# Patient Record
Sex: MALE | Race: WHITE | NOT HISPANIC OR LATINO | URBAN - METROPOLITAN AREA
[De-identification: names, ages, dates, MRNs, and addresses within clinical notes are randomized per-mention and may not be internally consistent; named-entity substitution may affect disease eponyms.]

---

## 2020-06-04 ENCOUNTER — OFFICE VISIT (OUTPATIENT)
Dept: URBAN - METROPOLITAN AREA TELEHEALTH 2 | Facility: TELEHEALTH | Age: 85
End: 2020-06-04
Payer: MEDICARE

## 2020-06-04 DIAGNOSIS — D50.9 IDA (IRON DEFICIENCY ANEMIA): ICD-10-CM

## 2020-06-04 DIAGNOSIS — K57.90 DIVERTICULOSIS: ICD-10-CM

## 2020-06-04 DIAGNOSIS — Z86.010 PERSONAL HISTORY OF COLONIC POLYPS: ICD-10-CM

## 2020-06-04 DIAGNOSIS — K44.9 HIATAL HERNIA: ICD-10-CM

## 2020-06-04 PROCEDURE — G8427 DOCREV CUR MEDS BY ELIG CLIN: HCPCS | Performed by: NURSE PRACTITIONER

## 2020-06-04 PROCEDURE — 99443 PHONE E/M BY PHYS 21-30 MIN: CPT | Performed by: NURSE PRACTITIONER

## 2020-06-04 RX ORDER — RAMIPRIL 5 MG/1
TAKE 1 CAPSULE (5 MG) BY ORAL ROUTE 2 TIMES PER DAY CAPSULE ORAL 2
Qty: 0 | Refills: 0 | Status: ACTIVE | COMMUNITY
Start: 1900-01-01 | End: 1900-01-01

## 2020-06-04 RX ORDER — CLOPIDOGREL 75 MG/1
TAKE 1 TABLET (75 MG) BY ORAL ROUTE ONCE DAILY TABLET ORAL 1
Qty: 0 | Refills: 0 | Status: ACTIVE | COMMUNITY
Start: 1900-01-01 | End: 1900-01-01

## 2020-06-04 RX ORDER — FUROSEMIDE 40 MG/1
AS DIRECTED TABLET ORAL
Qty: 0 | Refills: 0 | Status: ACTIVE | COMMUNITY
Start: 1900-01-01 | End: 1900-01-01

## 2020-06-04 RX ORDER — ATORVASTATIN CALCIUM 40 MG/1
TAKE 1 TABLET (40 MG) BY ORAL ROUTE ONCE DAILY TABLET, FILM COATED ORAL 1
Qty: 0 | Refills: 0 | Status: ACTIVE | COMMUNITY
Start: 1900-01-01 | End: 1900-01-01

## 2020-06-04 RX ORDER — NITROGLYCERIN 0.4 MG/1
PLACE 1 TABLET (0.4 MG) BY BUCCAL ROUTE AT THE FIRST SIGN OF AN ATTACK; NO MORE THAN 3 TABS ARE RECOMMENDED WITHIN A 15 MINUTE PERIOD TABLET SUBLINGUAL
Qty: 1 | Refills: 0 | Status: ACTIVE | COMMUNITY
Start: 1900-01-01 | End: 1900-01-01

## 2020-06-04 RX ORDER — POTASSIUM CHLORIDE 750 MG/1
TAKE 1 TABLET (10 MEQ) BY ORAL ROUTE 2 TIMES PER DAY WITH FOOD TABLET, EXTENDED RELEASE ORAL 2
Qty: 0 | Refills: 0 | Status: ACTIVE | COMMUNITY
Start: 1900-01-01 | End: 1900-01-01

## 2020-06-04 RX ORDER — CARVEDILOL 3.12 MG/1
TAKE 1 TABLET (3.125 MG) BY ORAL ROUTE 2 TIMES PER DAY WITH FOOD TABLET, FILM COATED ORAL 2
Qty: 0 | Refills: 0 | Status: ACTIVE | COMMUNITY
Start: 1900-01-01 | End: 1900-01-01

## 2020-06-04 RX ORDER — CHLORHEXIDINE GLUCONATE 4 %
TAKE 1 TABLET (325 MG) BY ORAL ROUTE 2 TIMES PER DAY LIQUID (ML) TOPICAL 2
Qty: 0 | Refills: 0 | Status: ACTIVE | COMMUNITY
Start: 1900-01-01 | End: 1900-01-01

## 2020-06-04 RX ORDER — AMIODARONE HYDROCHLORIDE 200 MG/1
TAKE 1 TABLET (200 MG) BY ORAL ROUTE ONCE DAILY TABLET ORAL 1
Qty: 0 | Refills: 0 | Status: ACTIVE | COMMUNITY
Start: 1900-01-01 | End: 1900-01-01

## 2020-06-04 RX ORDER — PANTOPRAZOLE SODIUM 40 MG/1
TAKE 1 TABLET BY ORAL ROUTE 2 TIMES A DAY TABLET, DELAYED RELEASE ORAL 2
Qty: 180 | Refills: 3 | Status: ACTIVE | COMMUNITY
Start: 2019-09-04 | End: 2020-08-29

## 2020-06-04 RX ORDER — ISOSORBIDE MONONITRATE 30 MG/1
TAKE 1 TABLET (30 MG) BY ORAL ROUTE ONCE DAILY IN THE MORNING TABLET, EXTENDED RELEASE ORAL 1
Qty: 0 | Refills: 0 | Status: ACTIVE | COMMUNITY
Start: 1900-01-01 | End: 1900-01-01

## 2020-06-04 NOTE — PHYSICAL EXAM HENT:
Head,  normocephalic,  atraumatic,  Face,  Face within normal limits,  Ears,  External ears within normal limits,  Nose/Nasopharynx,  External nose  normal appearance,  nares patent,  no nasal discharge,  Mouth and Throat,  Oral cavity appearance normal,  Lips,  Appearance normal

## 2020-06-04 NOTE — HPI-TODAY'S VISIT:
2020 Mr. Earl presents for follow up of BRADFORD and history of GI bleeding. He states he saw Dr. Chaudhry and was told his hemoglobin was too high. He has been off his iron supplement for over a month. He denies any further GI complaints. He states his bowels are normal with no bleeding or melena. He denies any nausea or vomiging. He is on protonix 40mg BID, he is not sure he is taking the carafate. He is not seeing hematology. Today he is diong well with no new GI complaints. MB   2019 Mr. Earl presents for follow up of BRADFORD and GI bleeding. Since his last visit he has been doing well on protonix 40mg BID and carafate BID with no further episodes of GI bleeding. his repeat labs show normal H/H at his September appointment. He denies any further melena, his bowels are dark on the oral iron. He is feeling well today with no further indigestion on the carafate. Today he has no further GI complaints. MB  19 Mr Earl is here for hospital f/u of melena. He had been having dark looser stool for about a week. He started to feel very weak bringing him to the ER. His Hbg was 9. It had been 15 a month prior. 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 He had an inpatinet enteroscopy with DR hendrix. 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 He denies any further dark stool. He is on protonix 40mg daily. He has been having more indigestion lately. He was on protonix 40mg BID and carafate earlier this year and this seemed to help. He remains of Xarelto. He was on plavix. He is not sure if he is still taking this.   2019 Mr. Earl presents for [facility] hospital follow up. He was admitted in early March with an NSTEMI. He was started on heparin and noted to have dark stools. His hemoccult was positive. He has a history of occult GI bleeding. He is s/p EGD and Colonoscopy in 2018 with hiatal hernia and left sided diverticulosis by Dr. Goyal with no bleeding. In  he had an EGD with rashard's erosions and diverticular disease by Dr. Sunshine. We did not pursue CTE/VCE given normal fit and normalization of H/H on iron and PPI. Durin his admission his H/H stayed above 13. His stools are always dark on oral iron BID. He is on xeralto for a-fib and now plavix with NSTEMI. His bowels are still dark but no mention of melena or BRBPR. He is on carafate TID and Protonix 40mg BID. MB      Patient seen today via telehealth by agreement and consent of patient in light of current COVID-19 pandemic. I used video conferencing during the visit. The patient encounter is appropriate and reasonable under the circumstances given the patient's particular presentation at this time. The patient has been advised of the followin) the potential risks and limitations of this mode of treatment (including but not limited to the absence of in-person examination); 2) the right to refuse telehealth services at any point without affecting the right to future care; 3) the right to receive in-person services, included immediately after this consultation if an urgent need arises; 4) information, including identifiable images or information from this telehealth consult, will only be shared in accordance with HIPPA regulations. Any and all of the patient's and/or patient's family member's questions on this issue have been answered. The patient has verbally consented to be treated via telehealth services. The patient has also been advised to contact this office for worsening conditions or problems, and seek emergency medical treatment and/or call 911 if the patient deems either necessary.

## 2020-07-16 ENCOUNTER — DASHBOARD ENCOUNTERS (OUTPATIENT)
Age: 85
End: 2020-07-16

## 2020-07-16 ENCOUNTER — OFFICE VISIT (OUTPATIENT)
Dept: URBAN - METROPOLITAN AREA TELEHEALTH 2 | Facility: TELEHEALTH | Age: 85
End: 2020-07-16
Payer: MEDICARE

## 2020-07-16 DIAGNOSIS — K44.9 HIATAL HERNIA: ICD-10-CM

## 2020-07-16 DIAGNOSIS — K57.90 DIVERTICULOSIS: ICD-10-CM

## 2020-07-16 DIAGNOSIS — D50.9 IDA (IRON DEFICIENCY ANEMIA): ICD-10-CM

## 2020-07-16 DIAGNOSIS — K57.30 ACQUIRED DIVERTICULOSIS OF COLON: ICD-10-CM

## 2020-07-16 DIAGNOSIS — D50.8 IRON DEFICIENCY ANEMIA DUE TO DIETARY CAUSES: ICD-10-CM

## 2020-07-16 DIAGNOSIS — Z86.010 PERSONAL HISTORY OF COLONIC POLYPS: ICD-10-CM

## 2020-07-16 PROBLEM — 428283002: Status: ACTIVE | Noted: 2020-06-04

## 2020-07-16 PROCEDURE — 99441 PHONE E/M BY PHYS 5-10 MIN: CPT | Performed by: NURSE PRACTITIONER

## 2020-07-16 RX ORDER — PANTOPRAZOLE SODIUM 40 MG/1
TAKE 1 TABLET BY ORAL ROUTE 2 TIMES A DAY TABLET, DELAYED RELEASE ORAL 2
Qty: 180 | Refills: 3 | Status: ACTIVE | COMMUNITY
Start: 2019-09-04 | End: 2020-08-29

## 2020-07-16 RX ORDER — NITROGLYCERIN 0.4 MG/1
PLACE 1 TABLET (0.4 MG) BY BUCCAL ROUTE AT THE FIRST SIGN OF AN ATTACK; NO MORE THAN 3 TABS ARE RECOMMENDED WITHIN A 15 MINUTE PERIOD TABLET SUBLINGUAL
Qty: 1 | Refills: 0 | Status: ACTIVE | COMMUNITY
Start: 1900-01-01

## 2020-07-16 RX ORDER — FUROSEMIDE 40 MG/1
AS DIRECTED TABLET ORAL
Qty: 0 | Refills: 0 | Status: ACTIVE | COMMUNITY
Start: 1900-01-01

## 2020-07-16 RX ORDER — ISOSORBIDE MONONITRATE 30 MG/1
TAKE 1 TABLET (30 MG) BY ORAL ROUTE ONCE DAILY IN THE MORNING TABLET, EXTENDED RELEASE ORAL 1
Qty: 0 | Refills: 0 | Status: ACTIVE | COMMUNITY
Start: 1900-01-01

## 2020-07-16 RX ORDER — RAMIPRIL 5 MG/1
TAKE 1 CAPSULE (5 MG) BY ORAL ROUTE 2 TIMES PER DAY CAPSULE ORAL 2
Qty: 0 | Refills: 0 | Status: ACTIVE | COMMUNITY
Start: 1900-01-01

## 2020-07-16 RX ORDER — POTASSIUM CHLORIDE 750 MG/1
TAKE 1 TABLET (10 MEQ) BY ORAL ROUTE 2 TIMES PER DAY WITH FOOD TABLET, EXTENDED RELEASE ORAL 2
Qty: 0 | Refills: 0 | Status: ACTIVE | COMMUNITY
Start: 1900-01-01

## 2020-07-16 RX ORDER — ATORVASTATIN CALCIUM 40 MG/1
TAKE 1 TABLET (40 MG) BY ORAL ROUTE ONCE DAILY TABLET, FILM COATED ORAL 1
Qty: 0 | Refills: 0 | Status: ACTIVE | COMMUNITY
Start: 1900-01-01

## 2020-07-16 RX ORDER — AMIODARONE HYDROCHLORIDE 200 MG/1
TAKE 1 TABLET (200 MG) BY ORAL ROUTE ONCE DAILY TABLET ORAL 1
Qty: 0 | Refills: 0 | Status: ACTIVE | COMMUNITY
Start: 1900-01-01

## 2020-07-16 RX ORDER — CARVEDILOL 3.12 MG/1
TAKE 1 TABLET (3.125 MG) BY ORAL ROUTE 2 TIMES PER DAY WITH FOOD TABLET, FILM COATED ORAL 2
Qty: 0 | Refills: 0 | Status: ACTIVE | COMMUNITY
Start: 1900-01-01

## 2020-07-16 RX ORDER — CLOPIDOGREL 75 MG/1
TAKE 1 TABLET (75 MG) BY ORAL ROUTE ONCE DAILY TABLET ORAL 1
Qty: 0 | Refills: 0 | Status: ACTIVE | COMMUNITY
Start: 1900-01-01

## 2020-07-16 RX ORDER — CHLORHEXIDINE GLUCONATE 4 %
TAKE 1 TABLET (325 MG) BY ORAL ROUTE 2 TIMES PER DAY LIQUID (ML) TOPICAL 2
Qty: 0 | Refills: 0 | Status: ACTIVE | COMMUNITY
Start: 1900-01-01

## 2020-07-16 NOTE — HPI-TODAY'S VISIT:
7/16/2020 12/4/2019 Mr. Earl presents for follow up of BRADFORD and GI bleeding. Since his last visit he has been doing well on protonix 40mg BID and carafate BID with no further episodes of GI bleeding. his repeat labs show normal H/H at his September appointment. He denies any further melena, his bowels are dark on the oral iron. He is feeling well today with no further indigestion on the carafate. Today he has no further GI complaints. MB  9/4/19 Mr Earl is here for hospital f/u of melena. He had been having dark looser stool for about a week. He started to feel very weak bringing him to the ER. His Hbg was 9. It had been 15 a month prior. 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 He had an inpatinet enteroscopy with DR hendrix. 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 He denies any further dark stool. He is on protonix 40mg daily. He has been having more indigestion lately. He was on protonix 40mg BID and carafate earlier this year and this seemed to help. He remains of Xarelto. He was on plavix. He is not sure if he is still taking this. CS 4/2019 Mr. Earl presents for [facility] hospital follow up. He was admitted in early March with an NSTEMI. He was started on heparin and noted to have dark stools. His hemoccult was positive. He has a history of occult GI bleeding. He is s/p EGD and Colonoscopy in January 2018 with hiatal hernia and left sided diverticulosis by Dr. Goyal with no bleeding. In 2017 he had an EGD with rashard's erosions and diverticular disease by Dr. Sunshine. We did not pursue CTE/VCE given normal fit and normalization of H/H on iron and PPI. Durin his admission his H/H stayed above 13. His stools are always dark on oral iron BID. He is on xeralto for a-fib and now plavix with NSTEMI. His bowels are still dark but no mention of melena or BRBPR. He is on carafate TID and Protonix 40mg BID. MB

## 2021-01-14 ENCOUNTER — OFFICE VISIT (OUTPATIENT)
Dept: URBAN - METROPOLITAN AREA TELEHEALTH 2 | Facility: TELEHEALTH | Age: 86
End: 2021-01-14

## 2021-01-14 RX ORDER — FUROSEMIDE 40 MG/1
AS DIRECTED TABLET ORAL
Qty: 0 | Refills: 0 | Status: ACTIVE | COMMUNITY
Start: 1900-01-01

## 2021-01-14 RX ORDER — ISOSORBIDE MONONITRATE 30 MG/1
TAKE 1 TABLET (30 MG) BY ORAL ROUTE ONCE DAILY IN THE MORNING TABLET, EXTENDED RELEASE ORAL 1
Qty: 0 | Refills: 0 | Status: ACTIVE | COMMUNITY
Start: 1900-01-01

## 2021-01-14 RX ORDER — CLOPIDOGREL 75 MG/1
TAKE 1 TABLET (75 MG) BY ORAL ROUTE ONCE DAILY TABLET ORAL 1
Qty: 0 | Refills: 0 | Status: ACTIVE | COMMUNITY
Start: 1900-01-01

## 2021-01-14 RX ORDER — NITROGLYCERIN 0.4 MG/1
PLACE 1 TABLET (0.4 MG) BY BUCCAL ROUTE AT THE FIRST SIGN OF AN ATTACK; NO MORE THAN 3 TABS ARE RECOMMENDED WITHIN A 15 MINUTE PERIOD TABLET SUBLINGUAL
Qty: 1 | Refills: 0 | Status: ACTIVE | COMMUNITY
Start: 1900-01-01

## 2021-01-14 RX ORDER — ATORVASTATIN CALCIUM 40 MG/1
TAKE 1 TABLET (40 MG) BY ORAL ROUTE ONCE DAILY TABLET, FILM COATED ORAL 1
Qty: 0 | Refills: 0 | Status: ACTIVE | COMMUNITY
Start: 1900-01-01

## 2021-01-14 RX ORDER — POTASSIUM CHLORIDE 750 MG/1
TAKE 1 TABLET (10 MEQ) BY ORAL ROUTE 2 TIMES PER DAY WITH FOOD TABLET, EXTENDED RELEASE ORAL 2
Qty: 0 | Refills: 0 | Status: ACTIVE | COMMUNITY
Start: 1900-01-01

## 2021-01-14 RX ORDER — RAMIPRIL 5 MG/1
TAKE 1 CAPSULE (5 MG) BY ORAL ROUTE 2 TIMES PER DAY CAPSULE ORAL 2
Qty: 0 | Refills: 0 | Status: ACTIVE | COMMUNITY
Start: 1900-01-01

## 2021-01-14 RX ORDER — AMIODARONE HYDROCHLORIDE 200 MG/1
TAKE 1 TABLET (200 MG) BY ORAL ROUTE ONCE DAILY TABLET ORAL 1
Qty: 0 | Refills: 0 | Status: ACTIVE | COMMUNITY
Start: 1900-01-01

## 2021-01-14 RX ORDER — CARVEDILOL 3.12 MG/1
TAKE 1 TABLET (3.125 MG) BY ORAL ROUTE 2 TIMES PER DAY WITH FOOD TABLET, FILM COATED ORAL 2
Qty: 0 | Refills: 0 | Status: ACTIVE | COMMUNITY
Start: 1900-01-01

## 2021-01-14 RX ORDER — CHLORHEXIDINE GLUCONATE 4 %
TAKE 1 TABLET (325 MG) BY ORAL ROUTE 2 TIMES PER DAY LIQUID (ML) TOPICAL 2
Qty: 0 | Refills: 0 | Status: ACTIVE | COMMUNITY
Start: 1900-01-01